# Patient Record
Sex: MALE | Race: WHITE | NOT HISPANIC OR LATINO | Employment: OTHER | URBAN - METROPOLITAN AREA
[De-identification: names, ages, dates, MRNs, and addresses within clinical notes are randomized per-mention and may not be internally consistent; named-entity substitution may affect disease eponyms.]

---

## 2024-01-22 ENCOUNTER — OFFICE VISIT (OUTPATIENT)
Dept: SURGERY | Facility: CLINIC | Age: 64
End: 2024-01-22
Payer: COMMERCIAL

## 2024-01-22 DIAGNOSIS — K40.30 INCARCERATED RIGHT INGUINAL HERNIA: Primary | ICD-10-CM

## 2024-01-22 PROCEDURE — 1036F TOBACCO NON-USER: CPT | Performed by: PHYSICIAN ASSISTANT

## 2024-01-22 PROCEDURE — 99214 OFFICE O/P EST MOD 30 MIN: CPT | Performed by: PHYSICIAN ASSISTANT

## 2024-01-22 RX ORDER — BISOPROLOL FUMARATE AND HYDROCHLOROTHIAZIDE 5; 6.25 MG/1; MG/1
TABLET ORAL
COMMUNITY
Start: 2023-07-28

## 2024-01-22 RX ORDER — LEVOFLOXACIN 750 MG/1
TABLET ORAL
COMMUNITY
Start: 2023-02-02

## 2024-01-22 RX ORDER — LORATADINE 10 MG/1
10 TABLET ORAL
COMMUNITY
Start: 2023-07-28

## 2024-01-22 NOTE — PROGRESS NOTES
Subjective   Patient ID: Wilfred Navarrete is a 63 y.o. male who presents for Hernia (Right inguinal hernia).    HPI  63-year-old gentleman with a history of prostate cancer and pelvic radiation.  Patient had a open left inguinal hernia August 2023 for a left inguinal hernia.  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  He now has a significant right inguinal bulge and is wearing a hernia belt.  He is inquiring about getting his right inguinal hernia fixed      Review of Systems    Negative other than mentioned in HPI    ENT: No earache, no sore throat, no nosebleeds  Cardiovascular: No chest pain, no shortness of breath, no leg pain, no edema  Respiratory: No shortness of breath on exertion, no wheezing  Gastrointestinal: No abdominal pain, no melena, no nausea, vomiting and/or diarrhea  Right inguinal pain and bulge  Musculoskeletal: No pain moving all extremities, no back pain ambulating normally  Skin: No rashes, no lesions, and no skin changes  Neuro: No headache, no confusion, no numbness and tingling  Psychiatric, normal mood, not suicidal, not homicidal, feeling good        Physical Exam  Eyes: Conjunctiva non -icteric and eye lids are without obvious rash or drooping. Pupils are symmetric.   Ears, Nose, Mouth, and Throat: External ears and nose appear to be without deformity or rash. No lesions or masses noted. Hearing is grossly intact.   Neck:. No JVD noted, tracheal position is midline. No thyromegaly, no thyroid nodules  Head and Face: Examination of the head and face revealed no abnormalities.   Respiratory: No gasping or shortness of breath noted, no use of accessory muscles noted. Clear to auscultate bilaterally  Cardiovascular: Examination for edema is normal. Regular rate and rhythm S1 S2 without murmurs  GI: Abdomen non tender to palpation, bowel sounds present no  hepatosplenomegaly  Patient has a right incarcerated inguinal hernia present, both testes down.  Previous left open inguinal hernia site is intact  Skin: No rashes or open lesions/ulcers identified on skin.   Musk: Digits/nails show no clubbing or cyanosis. No asymmetry or masses noted of the musculature. Examination of the muscles/joints/bones show normal range of motion. Gait is grossly normally.   Neurologic: Cranial nerves II- XII intact, motor strength 5/5 muscle strength of the lower extremities bilaterally and equal.      Objective     No diagnosis found.   There is no problem list on file for this patient.     Allergies   Allergen Reactions    Oxycodone-Acetaminophen Nausea/vomiting      Medication Documentation Review Audit       Reviewed by Itzel Jorgensen MA (Medical Assistant) on 01/22/24 at 0829      Medication Order Taking? Sig Documenting Provider Last Dose Status   bisoproloL-hydrochlorothiazide (Ziac) 5-6.25 mg tablet [id number] Yes 1 tabs, ORAL, DAILY, # 30 tabs, Refill(s) 0, Date: 07/28/2023 09:32:00 EDT Historical Provider, MD Taking Active   levoFLOXacin (Levaquin) 750 mg tablet [id number] Yes take 1 (ONE) tablet by mouth morning OF procedure Historical Provider, MD Taking Active   loratadine (Claritin) 10 mg tablet [id number] Yes 1 tablet (10 mg). Historical Provider, MD Taking Active                    History reviewed. No pertinent past medical history.  Social History     Tobacco Use   Smoking Status Never   Smokeless Tobacco Never     No family history on file.   History reviewed. No pertinent surgical history.    Assessment/Plan   Today we had a discussion about opened right inguinal hernia repair with mesh.  Patient was informed that this is an outpatient surgery.  The surgery takes 1 to 1-1/2 hours.  There will be an incision made right inguinal area,  requires a general anesthesia.  Patient will need a ride to and from the hospital.  Risk and benefits such as bleeding and infection were  discussed as well.  Surgeries were described in detail.  Patient had complete understanding.  All questions were answered.  Patient would like to proceed.       Encounter Diagnosis   Name Primary?    Incarcerated right inguinal hernia Yes     I have reviewed all data including labs,radiologic and previous reports.      **Portions of this medical record have been created using voice recognition software and may have minor errors which are inherent in voice recognition systems. It has not been fully edited for typographical or grammatical errors**

## 2024-01-30 LAB
NON-UH HIE BUN/CREAT RATIO: 16.4
NON-UH HIE BUN: 18 MG/DL (ref 9–23)
NON-UH HIE CALCIUM: 9.1 MG/DL (ref 8.7–10.4)
NON-UH HIE CALCULATED OSMOLALITY: 282 MOSM/KG (ref 275–295)
NON-UH HIE CHLORIDE: 105 MMOL/L (ref 98–107)
NON-UH HIE CO2, VENOUS: 31 MMOL/L (ref 20–31)
NON-UH HIE CREATININE: 1.1 MG/DL (ref 0.6–1.1)
NON-UH HIE GFR AA: >60
NON-UH HIE GLOMERULAR FILTRATION RATE: >60 ML/MIN/1.73M?
NON-UH HIE GLUCOSE: 84 MG/DL (ref 74–106)
NON-UH HIE HCT: 42.1 % (ref 41–52)
NON-UH HIE HGB: 14.3 G/DL (ref 13.5–17.5)
NON-UH HIE INSTR WBC ND: 7.3
NON-UH HIE K: 3.9 MMOL/L (ref 3.5–5.1)
NON-UH HIE MCH: 30.2 PG (ref 27–34)
NON-UH HIE MCHC: 34.1 G/DL (ref 32–37)
NON-UH HIE MCV: 88.7 FL (ref 80–100)
NON-UH HIE MPV: 7.4 FL (ref 7.4–10.4)
NON-UH HIE NA: 141 MMOL/L (ref 135–145)
NON-UH HIE PLATELET: 227 X10 (ref 150–450)
NON-UH HIE RBC: 4.74 X10 (ref 4.7–6.1)
NON-UH HIE RDW: 14.3 % (ref 11.5–14.5)
NON-UH HIE WBC: 7.3 X10 (ref 4.5–11)

## 2024-02-13 ENCOUNTER — OFFICE VISIT (OUTPATIENT)
Dept: SURGERY | Facility: CLINIC | Age: 64
End: 2024-02-13
Payer: COMMERCIAL

## 2024-02-13 DIAGNOSIS — Z09 STATUS POST INGUINAL HERNIA REPAIR, FOLLOW-UP EXAM: Primary | ICD-10-CM

## 2024-02-13 PROCEDURE — 1036F TOBACCO NON-USER: CPT | Performed by: PHYSICIAN ASSISTANT

## 2024-02-13 PROCEDURE — 99024 POSTOP FOLLOW-UP VISIT: CPT | Performed by: PHYSICIAN ASSISTANT

## 2024-02-13 NOTE — PROGRESS NOTES
Subjective   Patient ID: Wilfred Navarrete is a 63 y.o. male who presents for Post-op (Right inguinal hernia repair done on 01/30/24).    HPI  63-year-old gentleman status post open right inguinal hernia repair 2 weeks ago.  Patient is doing well without complaints.  He has a little bit of incisional pain.  No abdominal pain no nausea no vomiting no fevers.  States he feels pretty good.        Review of Systems  Negative other than mentioned in HPI    ENT: No earache, no sore throat, no nosebleeds  Cardiovascular: No chest pain, no shortness of breath, no leg pain, no edema  Respiratory: No shortness of breath on exertion, no wheezing  Gastrointestinal: No abdominal pain, no melena, no nausea, vomiting and/or diarrhea  Musculoskeletal: No pain moving all extremities, no back pain ambulating normally  Skin: No rashes, no lesions, and no skin changes  Neuro: No headache, no confusion, no numbness and tingling  Psychiatric, normal mood, not suicidal, not homicidal, feeling good          Physical Exam  Eyes: Conjunctiva non -icteric and eye lids are without obvious rash or drooping. Pupils are symmetric.   Ears, Nose, Mouth, and Throat: External ears and nose appear to be without deformity or rash. No lesions or masses noted. Hearing is grossly intact.   Neck:. No JVD noted, tracheal position is midline. No thyromegaly, no thyroid nodules  Head and Face: Examination of the head and face revealed no abnormalities.   Respiratory: No gasping or shortness of breath noted, no use of accessory muscles noted.  Cardiovascular: Examination for edema is normal.   GI: Abdomen non tender to palpation, bowel sounds present no hepatosplenomegaly  Right inguinal area hernias reduced incisions clean dry and intact no erythema no swelling no drainage very little glue left on the incision  Skin: No rashes or open lesions/ulcers identified on skin.   Musk: Digits/nails show no clubbing or cyanosis. No asymmetry or masses noted of the  musculature. Examination of the muscles/joints/bones show normal range of motion. Gait is grossly normally.   Neurologic: Cranial nerves II- XII intact, motor strength 5/5 muscle strength of the lower extremities bilaterally and equal.      Objective     No diagnosis found.   There is no problem list on file for this patient.     Allergies   Allergen Reactions    Oxycodone-Acetaminophen Nausea/vomiting      Medication Documentation Review Audit       Reviewed by Itzel Jorgensen MA (Medical Assistant) on 02/13/24 at 1331      Medication Order Taking? Sig Documenting Provider Last Dose Status   bisoproloL-hydrochlorothiazide (Ziac) 5-6.25 mg tablet [id number] No 1 tabs, ORAL, DAILY, # 30 tabs, Refill(s) 0, Date: 07/28/2023 09:32:00 EDT Historical Provider, MD Taking Active   levoFLOXacin (Levaquin) 750 mg tablet [id number] No take 1 (ONE) tablet by mouth morning OF procedure Historical Provider, MD Taking Active   loratadine (Claritin) 10 mg tablet [id number] No 1 tablet (10 mg). Historical Provider, MD Taking Active                    History reviewed. No pertinent past medical history.  Social History     Tobacco Use   Smoking Status Never   Smokeless Tobacco Never     No family history on file.   History reviewed. No pertinent surgical history.    Assessment/Plan   No lifting over 10 to 12 pounds for 4 weeks  No swimming pools hot tubs or lakes for 2 weeks  You may ride a stationary bike, you may use a treadmill, you may walk outside.  No squats, sit ups or lunges  You may drive a car  Follow-up as needed     Encounter Diagnosis   Name Primary?    Status post inguinal hernia repair, follow-up exam Yes     I have reviewed all data including labs,radiologic and previous reports.        **Portions of this medical record have been created using voice recognition software and may have minor errors which are inherent in voice recognition systems. It has not been fully edited for typographical or grammatical errors**